# Patient Record
Sex: FEMALE | Race: WHITE | Employment: OTHER | URBAN - METROPOLITAN AREA
[De-identification: names, ages, dates, MRNs, and addresses within clinical notes are randomized per-mention and may not be internally consistent; named-entity substitution may affect disease eponyms.]

---

## 2017-02-06 ENCOUNTER — HOSPITAL ENCOUNTER (OUTPATIENT)
Dept: PREADMISSION TESTING | Age: 82
Discharge: HOME | End: 2017-02-06
Payer: MEDICARE

## 2017-02-06 VITALS
BODY MASS INDEX: 46.23 KG/M2 | DIASTOLIC BLOOD PRESSURE: 61 MMHG | HEIGHT: 62 IN | TEMPERATURE: 98.2 F | HEART RATE: 74 BPM | SYSTOLIC BLOOD PRESSURE: 102 MMHG | WEIGHT: 251.25 LBS

## 2017-02-06 LAB
ANION GAP BLD CALC-SCNC: 8 MMOL/L (ref 5–15)
BASOPHILS # BLD AUTO: 0.1 K/UL (ref 0–0.1)
BASOPHILS # BLD: 0 % (ref 0–1)
BUN SERPL-MCNC: 26 MG/DL (ref 6–20)
BUN/CREAT SERPL: 24 (ref 12–20)
CALCIUM SERPL-MCNC: 9.2 MG/DL (ref 8.5–10.1)
CHLORIDE SERPL-SCNC: 101 MMOL/L (ref 97–108)
CO2 SERPL-SCNC: 27 MMOL/L (ref 21–32)
CREAT SERPL-MCNC: 1.1 MG/DL (ref 0.55–1.02)
EOSINOPHIL # BLD: 0.2 K/UL (ref 0–0.4)
EOSINOPHIL NFR BLD: 1 % (ref 0–7)
ERYTHROCYTE [DISTWIDTH] IN BLOOD BY AUTOMATED COUNT: 14 % (ref 11.5–14.5)
GLUCOSE SERPL-MCNC: 82 MG/DL (ref 65–100)
HCT VFR BLD AUTO: 40.1 % (ref 35–47)
HGB BLD-MCNC: 13.2 G/DL (ref 11.5–16)
LYMPHOCYTES # BLD AUTO: 12 % (ref 12–49)
LYMPHOCYTES # BLD: 1.7 K/UL (ref 0.8–3.5)
MCH RBC QN AUTO: 30.5 PG (ref 26–34)
MCHC RBC AUTO-ENTMCNC: 32.9 G/DL (ref 30–36.5)
MCV RBC AUTO: 92.6 FL (ref 80–99)
MONOCYTES # BLD: 1.1 K/UL (ref 0–1)
MONOCYTES NFR BLD AUTO: 7 % (ref 5–13)
NEUTS SEG # BLD: 11.1 K/UL (ref 1.8–8)
NEUTS SEG NFR BLD AUTO: 80 % (ref 32–75)
PLATELET # BLD AUTO: 222 K/UL (ref 150–400)
POTASSIUM SERPL-SCNC: 4.2 MMOL/L (ref 3.5–5.1)
RBC # BLD AUTO: 4.33 M/UL (ref 3.8–5.2)
SODIUM SERPL-SCNC: 136 MMOL/L (ref 136–145)
WBC # BLD AUTO: 14.1 K/UL (ref 3.6–11)

## 2017-02-06 PROCEDURE — 36415 COLL VENOUS BLD VENIPUNCTURE: CPT | Performed by: ORTHOPAEDIC SURGERY

## 2017-02-06 PROCEDURE — 85025 COMPLETE CBC W/AUTO DIFF WBC: CPT | Performed by: ORTHOPAEDIC SURGERY

## 2017-02-06 PROCEDURE — 93005 ELECTROCARDIOGRAM TRACING: CPT

## 2017-02-06 PROCEDURE — 80048 BASIC METABOLIC PNL TOTAL CA: CPT | Performed by: ORTHOPAEDIC SURGERY

## 2017-02-06 RX ORDER — FLECAINIDE ACETATE 50 MG/1
75 TABLET ORAL 2 TIMES DAILY
COMMUNITY
End: 2017-07-11

## 2017-02-06 NOTE — PERIOP NOTES
Pre-Operative Instructions    DO NOT EAT OR DRINK ANYTHING AFTER MIDNIGHT THE NIGHT BEFORE SURGERY. Patient given surgical site infection information FAQs handout and hand hygiene tips sheet. Pre-operative instructions reviewed and patient verbalizes understanding of instructions. Patient has been given the opportunity to ask additional questions. Given Skin prep chlorhexidine wipes-given written and verbal instructions on use.

## 2017-02-07 LAB
ATRIAL RATE: 51 BPM
CALCULATED P AXIS, ECG09: 56 DEGREES
CALCULATED R AXIS, ECG10: -26 DEGREES
CALCULATED T AXIS, ECG11: 36 DEGREES
DIAGNOSIS, 93000: NORMAL
P-R INTERVAL, ECG05: 150 MS
Q-T INTERVAL, ECG07: 468 MS
QRS DURATION, ECG06: 96 MS
QTC CALCULATION (BEZET), ECG08: 431 MS
VENTRICULAR RATE, ECG03: 51 BPM

## 2017-02-07 NOTE — H&P
Chief complaint:  Left hand numbness    History of present illness: This is a 52-year-old female with a 1-2 year history of left hand numbness and tingling. There is associated pain. The patient complains of intermittent night pain. Symptoms are constant. Treatment has consisted of multiple cortisone injections. She continues to complain of numbness in the index and middle fingers. Past medical history significant for DVT and hypertension    No current facility-administered medications on file prior to encounter. Current Outpatient Prescriptions on File Prior to Encounter   Medication Sig Dispense Refill    POTASSIUM CHLORIDE 20 mEq by Does Not Apply route daily.  lovastatin (MEVACOR) 20 mg tablet Take 20 mg by mouth nightly.  levothyroxine (LEVOXYL) 100 mcg tablet Take 100 mcg by mouth Daily (before breakfast).  benazepril (LOTENSIN) 20 mg tablet Take 20 mg by mouth two (2) times a day.  atenolol (TENORMIN) 25 mg tablet Take 12.5 mg by mouth two (2) times a day.  tramadol (ULTRAM) 50 mg tablet Take 50 mg by mouth as needed.  OMEPRAZOLE MAGNESIUM (PRILOSEC OTC PO) Take 20 mg by mouth daily. Takes on po daily       BENEFIBER, GUAR GUM, PO Take  by mouth. Takes 1/2 tab daily       MULTIVITS W-FE,OTHER MIN (CENTRUM PO) Take  by mouth. Centrum women's vitamin. Takes one po daily       amlodipine (NORVASC) 5 mg tablet Take 5 mg by mouth daily.          Past Medical History   Diagnosis Date    Adverse effect of anesthesia 1998     ASPIRATION    Arrhythmia      atrial fibrillation    Arthritis     Cancer ([facility] [address] 2000     colon - resection/no chemotherapy    Chronic kidney disease      lesion on kidney followed yearly with ultrasound or CT scan    GERD (gastroesophageal reflux disease)     Hypertension     Other ill-defined conditions(449.89)      increased cholesterol    Thromboembolus (HCC)      clot in leg from BCP    Thyroid disease        No Known Allergies    ROS neg    Social History     Social History    Marital status:      Spouse name: N/A    Number of children: N/A    Years of education: N/A     Occupational History    Not on file. Social History Main Topics    Smoking status: Never Smoker    Smokeless tobacco: Never Used    Alcohol use No    Drug use: No    Sexual activity: Not on file     Other Topics Concern    Not on file     Social History Narrative         Physical exam: Alert and oriented, in no acute distress. Respirations even and nonlabored. Mood and affect appropriate. Hearing intact to spoken word. Morbidly obese  female. Positive Tinel's over the carpal canal.  Positive carpal compression test.  5/5 thenar strength. 5/5 1st dorsal interosseous strength. Subjective sensitivity is intact. Negative bilateral Tinel's over the cubital tunnel. Negative flexion compression test at the elbow. Full wrist and digital motion. Full elbow motion. Otherwise neurovascularly intact. X-rays:  None  Nerve conduction study from January of 2016 confirms moderate carpal tunnel syndrome. Assessment:  Left carpal tunnel syndrome    Plan:  I recommend surgical management. The risks, benefits, alternatives and potential complications of surgery were discussed with the patient and she has elected to proceed. Risks discussed included persistent numbness, infection, bleeding, nerve injury, scar, chronic pain, and need for further surgery.

## 2017-02-08 ENCOUNTER — SURGERY (OUTPATIENT)
Age: 82
End: 2017-02-08

## 2017-02-08 ENCOUNTER — ANESTHESIA (OUTPATIENT)
Dept: MEDSURG UNIT | Age: 82
End: 2017-02-08
Payer: MEDICARE

## 2017-02-08 ENCOUNTER — HOSPITAL ENCOUNTER (OUTPATIENT)
Age: 82
Setting detail: OUTPATIENT SURGERY
Discharge: HOME | End: 2017-02-08
Attending: ORTHOPAEDIC SURGERY | Admitting: ORTHOPAEDIC SURGERY
Payer: MEDICARE

## 2017-02-08 ENCOUNTER — ANESTHESIA EVENT (OUTPATIENT)
Dept: MEDSURG UNIT | Age: 82
End: 2017-02-08
Payer: MEDICARE

## 2017-02-08 VITALS
HEART RATE: 62 BPM | RESPIRATION RATE: 18 BRPM | WEIGHT: 251.1 LBS | OXYGEN SATURATION: 96 % | HEIGHT: 62 IN | BODY MASS INDEX: 46.21 KG/M2 | TEMPERATURE: 97.8 F | DIASTOLIC BLOOD PRESSURE: 68 MMHG | SYSTOLIC BLOOD PRESSURE: 121 MMHG

## 2017-02-08 PROCEDURE — 77030002916 HC SUT ETHLN J&J -A: Performed by: ORTHOPAEDIC SURGERY

## 2017-02-08 PROCEDURE — 76210000050 HC AMBSU PH II REC 0.5 TO 1 HR: Performed by: ORTHOPAEDIC SURGERY

## 2017-02-08 PROCEDURE — 74011000250 HC RX REV CODE- 250

## 2017-02-08 PROCEDURE — 77030006602 HC BLD CRPL AGEE MCRA -C: Performed by: ORTHOPAEDIC SURGERY

## 2017-02-08 PROCEDURE — 76060000061 HC AMB SURG ANES 0.5 TO 1 HR: Performed by: ORTHOPAEDIC SURGERY

## 2017-02-08 PROCEDURE — 77030020754 HC CUF TRNQT 2BLA STRY -B: Performed by: ORTHOPAEDIC SURGERY

## 2017-02-08 PROCEDURE — 77030018836 HC SOL IRR NACL ICUM -A: Performed by: ORTHOPAEDIC SURGERY

## 2017-02-08 PROCEDURE — 74011250636 HC RX REV CODE- 250/636

## 2017-02-08 PROCEDURE — 77030026438 HC STYL ET INTUB CARD -A: Performed by: ANESTHESIOLOGY

## 2017-02-08 PROCEDURE — 77030028224 HC PDNG CST BSNM -A: Performed by: ORTHOPAEDIC SURGERY

## 2017-02-08 PROCEDURE — 74011000250 HC RX REV CODE- 250: Performed by: ORTHOPAEDIC SURGERY

## 2017-02-08 PROCEDURE — 76030000000 HC AMB SURG OR TIME 0.5 TO 1: Performed by: ORTHOPAEDIC SURGERY

## 2017-02-08 PROCEDURE — 77030008684 HC TU ET CUF COVD -B: Performed by: ANESTHESIOLOGY

## 2017-02-08 PROCEDURE — 74011250636 HC RX REV CODE- 250/636: Performed by: ANESTHESIOLOGY

## 2017-02-08 PROCEDURE — 76210000034 HC AMBSU PH I REC 0.5 TO 1 HR: Performed by: ORTHOPAEDIC SURGERY

## 2017-02-08 RX ORDER — SODIUM CHLORIDE 0.9 % (FLUSH) 0.9 %
5-10 SYRINGE (ML) INJECTION AS NEEDED
Status: DISCONTINUED | OUTPATIENT
Start: 2017-02-08 | End: 2017-02-08 | Stop reason: HOSPADM

## 2017-02-08 RX ORDER — FENTANYL CITRATE 50 UG/ML
INJECTION, SOLUTION INTRAMUSCULAR; INTRAVENOUS AS NEEDED
Status: DISCONTINUED | OUTPATIENT
Start: 2017-02-08 | End: 2017-02-08 | Stop reason: HOSPADM

## 2017-02-08 RX ORDER — LIDOCAINE HYDROCHLORIDE 10 MG/ML
0.1 INJECTION, SOLUTION EPIDURAL; INFILTRATION; INTRACAUDAL; PERINEURAL AS NEEDED
Status: DISCONTINUED | OUTPATIENT
Start: 2017-02-08 | End: 2017-02-08 | Stop reason: HOSPADM

## 2017-02-08 RX ORDER — MORPHINE SULFATE 10 MG/ML
2 INJECTION, SOLUTION INTRAMUSCULAR; INTRAVENOUS
Status: DISCONTINUED | OUTPATIENT
Start: 2017-02-08 | End: 2017-02-08 | Stop reason: HOSPADM

## 2017-02-08 RX ORDER — BUPIVACAINE HYDROCHLORIDE 5 MG/ML
INJECTION, SOLUTION EPIDURAL; INTRACAUDAL AS NEEDED
Status: DISCONTINUED | OUTPATIENT
Start: 2017-02-08 | End: 2017-02-08 | Stop reason: HOSPADM

## 2017-02-08 RX ORDER — SODIUM CHLORIDE, SODIUM LACTATE, POTASSIUM CHLORIDE, CALCIUM CHLORIDE 600; 310; 30; 20 MG/100ML; MG/100ML; MG/100ML; MG/100ML
125 INJECTION, SOLUTION INTRAVENOUS CONTINUOUS
Status: DISCONTINUED | OUTPATIENT
Start: 2017-02-08 | End: 2017-02-08 | Stop reason: HOSPADM

## 2017-02-08 RX ORDER — ROPIVACAINE HYDROCHLORIDE 5 MG/ML
30 INJECTION, SOLUTION EPIDURAL; INFILTRATION; PERINEURAL
Status: DISCONTINUED | OUTPATIENT
Start: 2017-02-08 | End: 2017-02-08 | Stop reason: HOSPADM

## 2017-02-08 RX ORDER — SUCCINYLCHOLINE CHLORIDE 20 MG/ML
INJECTION INTRAMUSCULAR; INTRAVENOUS AS NEEDED
Status: DISCONTINUED | OUTPATIENT
Start: 2017-02-08 | End: 2017-02-08 | Stop reason: HOSPADM

## 2017-02-08 RX ORDER — HYDROCODONE BITARTRATE AND ACETAMINOPHEN 5; 325 MG/1; MG/1
1-2 TABLET ORAL
Qty: 30 TAB | Refills: 0 | Status: SHIPPED | OUTPATIENT
Start: 2017-02-08 | End: 2017-07-11

## 2017-02-08 RX ORDER — SODIUM CHLORIDE 0.9 % (FLUSH) 0.9 %
5-10 SYRINGE (ML) INJECTION EVERY 8 HOURS
Status: DISCONTINUED | OUTPATIENT
Start: 2017-02-08 | End: 2017-02-08 | Stop reason: HOSPADM

## 2017-02-08 RX ORDER — SODIUM CHLORIDE, SODIUM LACTATE, POTASSIUM CHLORIDE, CALCIUM CHLORIDE 600; 310; 30; 20 MG/100ML; MG/100ML; MG/100ML; MG/100ML
75 INJECTION, SOLUTION INTRAVENOUS CONTINUOUS
Status: DISCONTINUED | OUTPATIENT
Start: 2017-02-08 | End: 2017-02-08 | Stop reason: HOSPADM

## 2017-02-08 RX ORDER — MIDAZOLAM HYDROCHLORIDE 1 MG/ML
0.5 INJECTION, SOLUTION INTRAMUSCULAR; INTRAVENOUS
Status: DISCONTINUED | OUTPATIENT
Start: 2017-02-08 | End: 2017-02-08 | Stop reason: HOSPADM

## 2017-02-08 RX ORDER — SODIUM CHLORIDE 9 MG/ML
25 INJECTION, SOLUTION INTRAVENOUS CONTINUOUS
Status: DISCONTINUED | OUTPATIENT
Start: 2017-02-08 | End: 2017-02-08 | Stop reason: HOSPADM

## 2017-02-08 RX ORDER — HYDROMORPHONE HYDROCHLORIDE 1 MG/ML
0.2 INJECTION, SOLUTION INTRAMUSCULAR; INTRAVENOUS; SUBCUTANEOUS
Status: DISCONTINUED | OUTPATIENT
Start: 2017-02-08 | End: 2017-02-08 | Stop reason: HOSPADM

## 2017-02-08 RX ORDER — PROPOFOL 10 MG/ML
INJECTION, EMULSION INTRAVENOUS AS NEEDED
Status: DISCONTINUED | OUTPATIENT
Start: 2017-02-08 | End: 2017-02-08 | Stop reason: HOSPADM

## 2017-02-08 RX ORDER — FENTANYL CITRATE 50 UG/ML
25 INJECTION, SOLUTION INTRAMUSCULAR; INTRAVENOUS
Status: DISCONTINUED | OUTPATIENT
Start: 2017-02-08 | End: 2017-02-08 | Stop reason: HOSPADM

## 2017-02-08 RX ORDER — MIDAZOLAM HYDROCHLORIDE 1 MG/ML
1 INJECTION, SOLUTION INTRAMUSCULAR; INTRAVENOUS AS NEEDED
Status: DISCONTINUED | OUTPATIENT
Start: 2017-02-08 | End: 2017-02-08 | Stop reason: HOSPADM

## 2017-02-08 RX ORDER — DIPHENHYDRAMINE HYDROCHLORIDE 50 MG/ML
12.5 INJECTION, SOLUTION INTRAMUSCULAR; INTRAVENOUS AS NEEDED
Status: DISCONTINUED | OUTPATIENT
Start: 2017-02-08 | End: 2017-02-08 | Stop reason: HOSPADM

## 2017-02-08 RX ORDER — DEXAMETHASONE SODIUM PHOSPHATE 4 MG/ML
INJECTION, SOLUTION INTRA-ARTICULAR; INTRALESIONAL; INTRAMUSCULAR; INTRAVENOUS; SOFT TISSUE AS NEEDED
Status: DISCONTINUED | OUTPATIENT
Start: 2017-02-08 | End: 2017-02-08 | Stop reason: HOSPADM

## 2017-02-08 RX ORDER — ONDANSETRON 2 MG/ML
INJECTION INTRAMUSCULAR; INTRAVENOUS AS NEEDED
Status: DISCONTINUED | OUTPATIENT
Start: 2017-02-08 | End: 2017-02-08 | Stop reason: HOSPADM

## 2017-02-08 RX ORDER — LIDOCAINE HYDROCHLORIDE 20 MG/ML
INJECTION, SOLUTION EPIDURAL; INFILTRATION; INTRACAUDAL; PERINEURAL AS NEEDED
Status: DISCONTINUED | OUTPATIENT
Start: 2017-02-08 | End: 2017-02-08 | Stop reason: HOSPADM

## 2017-02-08 RX ORDER — ROCURONIUM BROMIDE 10 MG/ML
INJECTION, SOLUTION INTRAVENOUS AS NEEDED
Status: DISCONTINUED | OUTPATIENT
Start: 2017-02-08 | End: 2017-02-08 | Stop reason: HOSPADM

## 2017-02-08 RX ORDER — FENTANYL CITRATE 50 UG/ML
50 INJECTION, SOLUTION INTRAMUSCULAR; INTRAVENOUS AS NEEDED
Status: DISCONTINUED | OUTPATIENT
Start: 2017-02-08 | End: 2017-02-08 | Stop reason: HOSPADM

## 2017-02-08 RX ADMIN — SUCCINYLCHOLINE CHLORIDE 120 MG: 20 INJECTION INTRAMUSCULAR; INTRAVENOUS at 09:30

## 2017-02-08 RX ADMIN — ROCURONIUM BROMIDE 5 MG: 10 INJECTION, SOLUTION INTRAVENOUS at 09:30

## 2017-02-08 RX ADMIN — SODIUM CHLORIDE, SODIUM LACTATE, POTASSIUM CHLORIDE, AND CALCIUM CHLORIDE 125 ML/HR: 600; 310; 30; 20 INJECTION, SOLUTION INTRAVENOUS at 08:15

## 2017-02-08 RX ADMIN — DEXAMETHASONE SODIUM PHOSPHATE 4 MG: 4 INJECTION, SOLUTION INTRA-ARTICULAR; INTRALESIONAL; INTRAMUSCULAR; INTRAVENOUS; SOFT TISSUE at 09:37

## 2017-02-08 RX ADMIN — FENTANYL CITRATE 50 MCG: 50 INJECTION, SOLUTION INTRAMUSCULAR; INTRAVENOUS at 09:30

## 2017-02-08 RX ADMIN — BUPIVACAINE HYDROCHLORIDE 6 ML: 5 INJECTION, SOLUTION EPIDURAL; INTRACAUDAL; PERINEURAL at 09:51

## 2017-02-08 RX ADMIN — PROPOFOL 150 MG: 10 INJECTION, EMULSION INTRAVENOUS at 09:30

## 2017-02-08 RX ADMIN — LIDOCAINE HYDROCHLORIDE 60 MG: 20 INJECTION, SOLUTION EPIDURAL; INFILTRATION; INTRACAUDAL; PERINEURAL at 09:30

## 2017-02-08 RX ADMIN — ONDANSETRON 4 MG: 2 INJECTION INTRAMUSCULAR; INTRAVENOUS at 09:29

## 2017-02-08 NOTE — ANESTHESIA PREPROCEDURE EVALUATION
Anesthetic History   No history of anesthetic complications            Review of Systems / Medical History  Patient summary reviewed, nursing notes reviewed and pertinent labs reviewed    Pulmonary  Within defined limits                 Neuro/Psych   Within defined limits           Cardiovascular    Hypertension: well controlled        Dysrhythmias : atrial fibrillation           GI/Hepatic/Renal  Within defined limits              Endo/Other      Hypothyroidism: well controlled  Morbid obesity     Other Findings              Physical Exam    Airway  Mallampati: II  TM Distance: > 6 cm  Neck ROM: normal range of motion   Mouth opening: Normal     Cardiovascular  Regular rate and rhythm,  S1 and S2 normal,  no murmur, click, rub, or gallop             Dental  No notable dental hx       Pulmonary  Breath sounds clear to auscultation               Abdominal  GI exam deferred       Other Findings            Anesthetic Plan    ASA: 3  Anesthesia type: general          Induction: Intravenous  Anesthetic plan and risks discussed with: Patient

## 2017-02-08 NOTE — IP AVS SNAPSHOT
[address] 
[phone] Patient: Landon Greenwood MRN: [id number] CHRIS:[birth year] You are allergic to the following No active allergies Recent Documentation Height Weight BMI OB Status Smoking Status 1.575 m 113.9 kg 45.93 kg/m2 Postmenopausal Never Smoker Emergency Contacts Name Discharge Info Relation Home Work Mobile Sameer Santo DISCHARGE CAREGIVER [3] Spouse [3] [phone] About your hospitalization You were admitted on:  February 8, 2017 You last received care in the:  [facility] PACU You were discharged on:  February 8, 2017 Unit phone number:  [phone] Why you were hospitalized Your primary diagnosis was:  Not on File Providers Seen During Your Hospitalizations Provider Role Specialty Primary office phone Mp Haile MD Attending Provider Orthopedic Surgery [phone] Your Primary Care Physician (PCP) Primary Care Physician Office Phone Office Fax Radah Larson [phone] Follow-up Information Follow up With Details Comments Contact Info Mp Haile MD In 2 weeks  [address] [facility] [address] [place] [address] 
[phone] Joseph Acuña MD   [place] [address] [address] [place] [address] [phone] Current Discharge Medication List  
  
START taking these medications Dose & Instructions Dispensing Information Comments Morning Noon Evening Bedtime HYDROcodone-acetaminophen 5-325 mg per tablet Commonly known as:  Sudhir Vivienne Your next dose is: Today, Tomorrow Other:  _________ Dose:  1-2 Tab Take 1-2 Tabs by mouth every four (4) hours as needed for Pain. Max Daily Amount: 12 Tabs. Quantity:  30 Tab Refills:  0 CONTINUE these medications which have NOT CHANGED Dose & Instructions Dispensing Information Comments Morning Noon Evening Bedtime  
 amLODIPine 5 mg tablet Commonly known as:  Cynthia Pace Your next dose is: Today, Tomorrow Other:  _________ Dose:  5 mg Take 5 mg by mouth daily. Refills:  0  
     
   
   
   
  
 atenolol 25 mg tablet Commonly known as:  TENORMIN Your next dose is: Today, Tomorrow Other:  _________ Dose:  12.5 mg Take 12.5 mg by mouth two (2) times a day. Refills:  0  
     
   
   
   
  
 benazepril 20 mg tablet Commonly known as:  LOTENSIN Your next dose is: Today, Tomorrow Other:  _________ Dose:  20 mg Take 20 mg by mouth two (2) times a day. Refills:  0 BENEFIBER (GUAR GUM) PO Your next dose is: Today, Tomorrow Other:  _________ Take  by mouth. Takes 1/2 tab daily Refills:  0 CENTRUM PO Your next dose is: Today, Tomorrow Other:  _________ Take  by mouth. Centrum women's vitamin. Takes one po daily Refills:  0  
     
   
   
   
  
 flecainide 50 mg tablet Commonly known as:  TAMBOCOR Your next dose is: Today, Tomorrow Other:  _________ Dose:  75 mg Take 75 mg by mouth two (2) times a day. Refills:  0 LEVOXYL 100 mcg tablet Generic drug:  levothyroxine Your next dose is: Today, Tomorrow Other:  _________ Dose:  100 mcg Take 100 mcg by mouth Daily (before breakfast). Refills:  0  
     
   
   
   
  
 lovastatin 20 mg tablet Commonly known as:  MEVACOR Your next dose is: Today, Tomorrow Other:  _________ Dose:  20 mg Take 20 mg by mouth nightly. Refills:  0 POTASSIUM CHLORIDE Your next dose is: Today, Tomorrow Other:  _________ Dose:  20 mEq 20 mEq by Does Not Apply route daily. Refills:  0 PRILOSEC OTC PO Your next dose is: Today, Tomorrow Other:  _________ Dose:  20 mg Take 20 mg by mouth daily. Takes on po daily Refills:  0  
     
   
   
   
  
 spironolactone 25 mg tab 25 mg, hydroCHLOROthiazide 25 mg tab 25 mg Your next dose is: Today, Tomorrow Other:  _________ Dose:  1 Dose Take 1 Dose by mouth nightly. Refills:  0 XARELTO 20 mg Tab tablet Generic drug:  rivaroxaban Your next dose is: Today, Tomorrow Other:  _________ Dose:  20 mg Take 20 mg by mouth nightly. Refills:  0 STOP taking these medications   
 traMADol 50 mg tablet Commonly known as:  ULTRAM  
   
  
  
  
Where to Get Your Medications Information on where to get these meds will be given to you by the nurse or doctor. ! Ask your nurse or doctor about these medications HYDROcodone-acetaminophen 5-325 mg per tablet Discharge Instructions DISCHARGE SUMMARY from Nurse The following personal items are in your possession at time of discharge: 
 
Dental Appliances: None Clothing:  (clothes in bag) PATIENT INSTRUCTIONS: 
 
 
F-face looks uneven A-arms unable to move or move unevenly S-speech slurred or non-existent T-time-call 911 as soon as signs and symptoms begin-DO NOT go Back to bed or wait to see if you get better-TIME IS BRAIN. Warning Signs of HEART ATTACK Call 911 if you have these symptoms: 
? Chest discomfort.  Most heart attacks involve discomfort in the center of the chest that lasts more than a few minutes, or that goes away and comes back. It can feel like uncomfortable pressure, squeezing, fullness, or pain. ? Discomfort in other areas of the upper body. Symptoms can include pain or discomfort in one or both arms, the back, neck, jaw, or stomach. ? Shortness of breath with or without chest discomfort. ? Other signs may include breaking out in a cold sweat, nausea, or lightheadedness. Don't wait more than five minutes to call [address]! Fast action can save your life. Calling 911 is almost always the fastest way to get lifesaving treatment. Emergency Medical Services staff can begin treatment when they arrive  up to an hour sooner than if someone gets to the hospital by car. The discharge information has been reviewed with the spouse. The spouse verbalized understanding. Discharge medications reviewed with the spouse and appropriate educational materials and side effects teaching were provided. Dr. Harvey Azevedo Carpal Tunnel Postoperative Instructions 1. Please maintain the dressing placed at surgery for 5 days. You may remove it in 5 days. Please keep the dressing clean and dry for 5 days. In 5 days you may get the wound wet and then cover it with a bandaid. If you have any questions or problems with your dressing, please call our office at [phone]. 
2. Please elevate the operative extremity to the level of the heart to keep swelling at a minimum. You may get up to move around, but when seated please keep the extremity elevated as much as possible. This will decrease swelling and postoperative pain. You may do activities as tolerated. 3. You may ice your arm/hand 5 times a day for 20 minutes at a time. 4. A prescription for pain medication is provided. The key to pain control is staying ahead of the pain. For the first 48-72 hours after your surgery, you may want to take your pain medication on a regular schedule. After that, you may only need it on an as needed basis. 5. Signs and symptoms of infection include: redness, increased pain, increased swelling not relieved by elevation, drainage, fever, or chills, If you develop any of these symptoms, call the office at [phone]. Please Follow-up at my office 14 days after surgery or when specified at your pre-operative appointment. Discharge Orders None Introducing [facility]! Dear Graeme Hay: Thank you for requesting a [employer] account. Our records indicate that you already have an active [employer] account. You can access your account anytime at [email or web address] Did you know that you can access your hospital and ER discharge instructions at any time in [employer]? You can also review all of your test results from your hospital stay or ER visit. Additional Information If you have questions, please visit the Frequently Asked Questions section of the [employer] website at [email or web address] Remember, [employer] is NOT to be used for urgent needs. For medical emergencies, dial 911. Now available from your iPhone and Android! General Information Please provide this summary of care documentation to your next provider. Patient Signature:  ____________________________________________________________ Date:  ____________________________________________________________  
  
Lior Woodward Provider Signature:  ____________________________________________________________ Date:  ____________________________________________________________

## 2017-02-08 NOTE — OP NOTES
PREOPERATIVE DIAGNOSIS: Left carpal tunnel syndrome. POSTOPERATIVE DIAGNOSIS: Left carpal tunnel syndrome. PROCEDURES PERFORMED: Endoscopic left carpal tunnel release. SURGEON: Frankey Glaze. Monica Carson MD     ANESTHESIA: General.     ESTIMATED BLOOD LOSS: Minimal.     SPECIMENS REMOVED: None. COMPLICATIONS: None. IMPLANTS: None. INDICATIONS FOR PROCEDURE: This is an [de-identified]year-old female with   electrodiagnostic evidence of severe carpal tunnel syndrome. She has failed   conservative treatment, and wishes to proceed with endoscopic carpal tunnel   release. After discussion of the risks, benefits, potential complications   and alternatives to surgery, he elected to proceed. DESCRIPTION OF PROCEDURE: The patient was identified in the preoperative   holding area. Informed consent was obtained, and the operative site was   marked. The patient was then transferred to the OR, and placed supine on the   operating table. After induction of general anesthesia, the left upper   extremity was sterilely prepped and draped in the usual fashion. A surgical   time-out was held, and the operative site was confirmed. Preoperative   antibiotics were not given. After Esmarch exsanguination, tourniquet was   elevated to 250 mmHg. A transverse incision was made just proximal to the   wrist flexion crease. Dissection was carried down through skin and   subcutaneous tissues, controlling bleeding with electrocautery. The   antebrachial fascia was incised sharply with a 15-blade. The proximal   aspect of the antebrachial fascia was then divided under direct   visualization. The carpal canal was entered, first with a synovial rasp,   and then serial dilators. The endoscope was placed. Good visualization of   the transverse carpal ligament was easily obtained. The transverse carpal   ligament was then sharply divided, starting distally and working   proximally. The 50% distal aspect of the ligament was transected.  The endoscope was then placed back into the carpal canal. Good release was   evident. The remaining 50% was then transected with the endoscope. The   endoscope was removed. The wound was thoroughly irrigated. The area of the   incision was anesthetized with 0.5% Marcaine. The wound was then closed   with 4-0 nylon sutures. Sterile dressings were applied. The tourniquet was let down and brisk cap refill returned to the digits. She tolerated the entire procedure well without complications.

## 2017-02-08 NOTE — ROUTINE PROCESS
Patient: Bandar Lundberg MRN: [id number]  SSN: [id number]   YOB: 1935  Age: [de-identified] y.o. Sex: female     Patient is status post Procedure(s):  LEFT ENDOSCOPIC CARPAL TUNNEL RELEASE. Surgeon(s) and Role:     * Jaimee Bonilla MD - Primary    Local/Dose/Irrigation:  See [facility]                  Peripheral IV 02/08/17 Right Hand (Active)   Site Assessment Clean, dry, & intact 2/8/2017  8:00 AM   Dressing Status Occlusive 2/8/2017  8:00 AM            Airway - Endotracheal Tube 02/08/17 Oral (Active)                   Dressing/Packing:  Wound Hand Left-DRESSING TYPE: 4 x 4;Cast padding;Cotton ball(s);Elastic bandage; Xeroform (02/08/17 0900)  Splint/Cast:  ]    Other:

## 2017-02-08 NOTE — ANESTHESIA POSTPROCEDURE EVALUATION
Post-Anesthesia Evaluation and Assessment    Patient: Landon Greenwood MRN: [id number]  SSN: [id number]    YOB: 1935  Age: [de-identified] y.o. Sex: female       Cardiovascular Function/Vital Signs  Visit Vitals    /64    Pulse 62    Temp 36.6 °C (97.8 °F)    Resp 21    Ht 5' 2\" (1.575 m)    Wt 113.9 kg (251 lb 1.7 oz)    SpO2 96%    BMI 45.93 kg/m2       Patient is status post general anesthesia for Procedure(s):  LEFT ENDOSCOPIC CARPAL TUNNEL RELEASE. Nausea/Vomiting: None    Postoperative hydration reviewed and adequate. Pain:  Pain Scale 1: Numeric (0 - 10) (02/08/17 1005)  Pain Intensity 1: 0 (02/08/17 1005)   Managed    Neurological Status:   Neuro (WDL): Within Defined Limits (02/08/17 1005)   At baseline    Mental Status and Level of Consciousness: Arousable    Pulmonary Status:   O2 Device: Nasal cannula (02/08/17 1007)   Adequate oxygenation and airway patent    Complications related to anesthesia: None    Post-anesthesia assessment completed.  No concerns    Signed By: Ofelia Aburto MD     February 8, 2017

## 2017-02-08 NOTE — DISCHARGE INSTRUCTIONS
DISCHARGE SUMMARY from Nurse    The following personal items are in your possession at time of discharge:    Dental Appliances: None              Clothing:  (clothes in bag)                PATIENT INSTRUCTIONS:    After general anesthesia or intravenous sedation, for 24 hours or while taking prescription Narcotics:  · Limit your activities  · Do not drive and operate hazardous machinery  · Do not make important personal or business decisions  · Do  not drink alcoholic beverages  · If you have not urinated within 8 hours after discharge, please contact your surgeon on call. Report the following to your surgeon:  · Excessive pain, swelling, redness or odor of or around the surgical area  · Temperature over 100.5  · Nausea and vomiting lasting longer than 4 hours or if unable to take medications  · Any signs of decreased circulation or nerve impairment to extremity: change in color, persistent  numbness, tingling, coldness or increase pain  · Any questions        What to do at Home:  Recommended activity: Activity as tolerated,     If you experience any of the following symptoms excess swelling or bleeding, please follow up with Dr Gilberto Garza. *  Please give a list of your current medications to your Primary Care Provider. *  Please update this list whenever your medications are discontinued, doses are      changed, or new medications (including over-the-counter products) are added. *  Please carry medication information at all times in case of emergency situations. These are general instructions for a healthy lifestyle:    No smoking/ No tobacco products/ Avoid exposure to second hand smoke    Surgeon General's Warning:  Quitting smoking now greatly reduces serious risk to your health.     Obesity, smoking, and sedentary lifestyle greatly increases your risk for illness    A healthy diet, regular physical exercise & weight monitoring are important for maintaining a healthy lifestyle    You may be retaining fluid if you have a history of heart failure or if you experience any of the following symptoms:  Weight gain of 3 pounds or more overnight or 5 pounds in a week, increased swelling in our hands or feet or shortness of breath while lying flat in bed. Please call your doctor as soon as you notice any of these symptoms; do not wait until your next office visit. Recognize signs and symptoms of STROKE:    F-face looks uneven    A-arms unable to move or move unevenly    S-speech slurred or non-existent    T-time-call 911 as soon as signs and symptoms begin-DO NOT go       Back to bed or wait to see if you get better-TIME IS BRAIN. Warning Signs of HEART ATTACK     Call 911 if you have these symptoms:   Chest discomfort. Most heart attacks involve discomfort in the center of the chest that lasts more than a few minutes, or that goes away and comes back. It can feel like uncomfortable pressure, squeezing, fullness, or pain.  Discomfort in other areas of the upper body. Symptoms can include pain or discomfort in one or both arms, the back, neck, jaw, or stomach.  Shortness of breath with or without chest discomfort.  Other signs may include breaking out in a cold sweat, nausea, or lightheadedness. Don't wait more than five minutes to call 911 - MINUTES MATTER! Fast action can save your life. Calling 911 is almost always the fastest way to get lifesaving treatment. Emergency Medical Services staff can begin treatment when they arrive -- up to an hour sooner than if someone gets to the hospital by car. The discharge information has been reviewed with the spouse. The spouse verbalized understanding. Discharge medications reviewed with the spouse and appropriate educational materials and side effects teaching were provided. Dr. Almonte Shayan Carpal Tunnel Postoperative Instructions    1. Please maintain the dressing placed at surgery for 5 days. You may remove it in 5 days.  Please keep the dressing clean and dry for 5 days. In 5 days you may get the wound wet and then cover it with a bandaid. If you have any questions or problems with your dressing, please call our office at [phone].  2. Please elevate the operative extremity to the level of the heart to keep swelling at a minimum. You may get up to move around, but when seated please keep the extremity elevated as much as possible. This will decrease swelling and postoperative pain. You may do activities as tolerated. 3. You may ice your arm/hand 5 times a day for 20 minutes at a time. 4. A prescription for pain medication is provided. The key to pain control is staying ahead of the pain. For the first 48-72 hours after your surgery, you may want to take your pain medication on a regular schedule. After that, you may only need it on an as needed basis. 5. Signs and symptoms of infection include: redness, increased pain, increased swelling not relieved by elevation, drainage, fever, or chills, If you develop any of these symptoms, call the office at [phone]. Please Follow-up at my office 14 days after surgery or when specified at your pre-operative appointment.

## 2017-04-19 ENCOUNTER — HOSPITAL ENCOUNTER (OUTPATIENT)
Dept: CT IMAGING | Age: 82
Discharge: HOME | End: 2017-04-19
Attending: NURSE PRACTITIONER
Payer: MEDICARE

## 2017-04-19 DIAGNOSIS — Z85.038 PERSONAL HISTORY OF COLON CANCER: ICD-10-CM

## 2017-04-19 DIAGNOSIS — Z79.01 LONG TERM (CURRENT) USE OF ANTICOAGULANTS: ICD-10-CM

## 2017-04-19 DIAGNOSIS — K86.2 CYST OF PANCREAS: ICD-10-CM

## 2017-04-19 DIAGNOSIS — K58.9 IRRITABLE BOWEL SYNDROME: ICD-10-CM

## 2017-04-19 DIAGNOSIS — I48.91 ATRIAL FIBRILLATION (HCC): ICD-10-CM

## 2017-04-19 PROCEDURE — 74177 CT ABD & PELVIS W/CONTRAST: CPT

## 2017-04-19 PROCEDURE — 74011636320 HC RX REV CODE- 636/320: Performed by: NURSE PRACTITIONER

## 2017-04-19 RX ADMIN — IOPAMIDOL 100 ML: 755 INJECTION, SOLUTION INTRAVENOUS at 10:39

## 2017-07-11 RX ORDER — FLECAINIDE ACETATE 150 MG/1
75 TABLET ORAL 2 TIMES DAILY
COMMUNITY

## 2017-07-11 RX ORDER — TRAMADOL HYDROCHLORIDE 50 MG/1
50 TABLET ORAL 3 TIMES DAILY
COMMUNITY

## 2017-07-12 ENCOUNTER — ANESTHESIA EVENT (OUTPATIENT)
Dept: ENDOSCOPY | Age: 82
End: 2017-07-12
Payer: MEDICARE

## 2017-07-12 ENCOUNTER — ANESTHESIA (OUTPATIENT)
Dept: ENDOSCOPY | Age: 82
End: 2017-07-12
Payer: MEDICARE

## 2017-07-12 ENCOUNTER — HOSPITAL ENCOUNTER (OUTPATIENT)
Age: 82
Setting detail: OUTPATIENT SURGERY
Discharge: HOME | End: 2017-07-12
Attending: SPECIALIST | Admitting: SPECIALIST
Payer: MEDICARE

## 2017-07-12 VITALS
RESPIRATION RATE: 19 BRPM | OXYGEN SATURATION: 92 % | HEART RATE: 61 BPM | TEMPERATURE: 98 F | SYSTOLIC BLOOD PRESSURE: 108 MMHG | DIASTOLIC BLOOD PRESSURE: 44 MMHG

## 2017-07-12 PROCEDURE — 76060000032 HC ANESTHESIA 0.5 TO 1 HR: Performed by: SPECIALIST

## 2017-07-12 PROCEDURE — 77030010936 HC CLP LIG BSC -C: Performed by: SPECIALIST

## 2017-07-12 PROCEDURE — 77030009426 HC FCPS BIOP ENDOSC BSC -B: Performed by: SPECIALIST

## 2017-07-12 PROCEDURE — 77030013992 HC SNR POLYP ENDOSC BSC -B: Performed by: SPECIALIST

## 2017-07-12 PROCEDURE — 88305 TISSUE EXAM BY PATHOLOGIST: CPT | Performed by: SPECIALIST

## 2017-07-12 PROCEDURE — 76040000007: Performed by: SPECIALIST

## 2017-07-12 PROCEDURE — 77030027957 HC TBNG IRR ENDOGTR BUSS -B: Performed by: SPECIALIST

## 2017-07-12 PROCEDURE — 74011000250 HC RX REV CODE- 250

## 2017-07-12 PROCEDURE — 74011250636 HC RX REV CODE- 250/636

## 2017-07-12 DEVICE — WORKING LENGTH 235CM, WORKING CHANNEL 2.8MM
Type: IMPLANTABLE DEVICE | Status: FUNCTIONAL
Brand: RESOLUTION 360 CLIP

## 2017-07-12 RX ORDER — FENTANYL CITRATE 50 UG/ML
200 INJECTION, SOLUTION INTRAMUSCULAR; INTRAVENOUS
Status: DISCONTINUED | OUTPATIENT
Start: 2017-07-12 | End: 2017-07-12 | Stop reason: HOSPADM

## 2017-07-12 RX ORDER — EPINEPHRINE 0.1 MG/ML
1 INJECTION INTRACARDIAC; INTRAVENOUS
Status: DISCONTINUED | OUTPATIENT
Start: 2017-07-12 | End: 2017-07-12 | Stop reason: HOSPADM

## 2017-07-12 RX ORDER — LORAZEPAM 2 MG/ML
2 INJECTION INTRAMUSCULAR AS NEEDED
Status: DISCONTINUED | OUTPATIENT
Start: 2017-07-12 | End: 2017-07-12 | Stop reason: HOSPADM

## 2017-07-12 RX ORDER — SODIUM CHLORIDE 9 MG/ML
INJECTION, SOLUTION INTRAVENOUS
Status: DISCONTINUED | OUTPATIENT
Start: 2017-07-12 | End: 2017-07-12 | Stop reason: HOSPADM

## 2017-07-12 RX ORDER — SODIUM CHLORIDE 9 MG/ML
50 INJECTION, SOLUTION INTRAVENOUS CONTINUOUS
Status: DISCONTINUED | OUTPATIENT
Start: 2017-07-12 | End: 2017-07-12 | Stop reason: HOSPADM

## 2017-07-12 RX ORDER — SODIUM CHLORIDE 0.9 % (FLUSH) 0.9 %
5-10 SYRINGE (ML) INJECTION EVERY 8 HOURS
Status: DISCONTINUED | OUTPATIENT
Start: 2017-07-12 | End: 2017-07-12 | Stop reason: HOSPADM

## 2017-07-12 RX ORDER — PROPOFOL 10 MG/ML
INJECTION, EMULSION INTRAVENOUS AS NEEDED
Status: DISCONTINUED | OUTPATIENT
Start: 2017-07-12 | End: 2017-07-12 | Stop reason: HOSPADM

## 2017-07-12 RX ORDER — MIDAZOLAM HYDROCHLORIDE 1 MG/ML
.25-1 INJECTION, SOLUTION INTRAMUSCULAR; INTRAVENOUS
Status: DISCONTINUED | OUTPATIENT
Start: 2017-07-12 | End: 2017-07-12 | Stop reason: HOSPADM

## 2017-07-12 RX ORDER — FLUMAZENIL 0.1 MG/ML
0.2 INJECTION INTRAVENOUS
Status: DISCONTINUED | OUTPATIENT
Start: 2017-07-12 | End: 2017-07-12 | Stop reason: HOSPADM

## 2017-07-12 RX ORDER — SODIUM CHLORIDE 0.9 % (FLUSH) 0.9 %
5-10 SYRINGE (ML) INJECTION AS NEEDED
Status: DISCONTINUED | OUTPATIENT
Start: 2017-07-12 | End: 2017-07-12 | Stop reason: HOSPADM

## 2017-07-12 RX ORDER — DEXTROMETHORPHAN/PSEUDOEPHED 2.5-7.5/.8
1.2 DROPS ORAL
Status: DISCONTINUED | OUTPATIENT
Start: 2017-07-12 | End: 2017-07-12 | Stop reason: HOSPADM

## 2017-07-12 RX ORDER — NALOXONE HYDROCHLORIDE 0.4 MG/ML
0.4 INJECTION, SOLUTION INTRAMUSCULAR; INTRAVENOUS; SUBCUTANEOUS
Status: DISCONTINUED | OUTPATIENT
Start: 2017-07-12 | End: 2017-07-12 | Stop reason: HOSPADM

## 2017-07-12 RX ORDER — ATROPINE SULFATE 0.1 MG/ML
0.5 INJECTION INTRAVENOUS
Status: DISCONTINUED | OUTPATIENT
Start: 2017-07-12 | End: 2017-07-12 | Stop reason: HOSPADM

## 2017-07-12 RX ORDER — GLYCOPYRROLATE 0.2 MG/ML
INJECTION INTRAMUSCULAR; INTRAVENOUS AS NEEDED
Status: DISCONTINUED | OUTPATIENT
Start: 2017-07-12 | End: 2017-07-12 | Stop reason: HOSPADM

## 2017-07-12 RX ADMIN — PROPOFOL 30 MG: 10 INJECTION, EMULSION INTRAVENOUS at 07:51

## 2017-07-12 RX ADMIN — PROPOFOL 30 MG: 10 INJECTION, EMULSION INTRAVENOUS at 08:00

## 2017-07-12 RX ADMIN — SODIUM CHLORIDE: 9 INJECTION, SOLUTION INTRAVENOUS at 07:16

## 2017-07-12 RX ADMIN — PROPOFOL 50 MG: 10 INJECTION, EMULSION INTRAVENOUS at 07:35

## 2017-07-12 RX ADMIN — GLYCOPYRROLATE 0.2 MG: 0.2 INJECTION INTRAMUSCULAR; INTRAVENOUS at 07:42

## 2017-07-12 RX ADMIN — PROPOFOL 30 MG: 10 INJECTION, EMULSION INTRAVENOUS at 07:39

## 2017-07-12 RX ADMIN — PROPOFOL 30 MG: 10 INJECTION, EMULSION INTRAVENOUS at 07:43

## 2017-07-12 RX ADMIN — PROPOFOL 30 MG: 10 INJECTION, EMULSION INTRAVENOUS at 08:05

## 2017-07-12 NOTE — ANESTHESIA PREPROCEDURE EVALUATION
Anesthetic History   No history of anesthetic complications            Review of Systems / Medical History  Patient summary reviewed, nursing notes reviewed and pertinent labs reviewed    Pulmonary        Sleep apnea           Neuro/Psych   Within defined limits           Cardiovascular    Hypertension        Dysrhythmias : atrial fibrillation      Exercise tolerance: >4 METS     GI/Hepatic/Renal     GERD          Comments: Hx polyps   Occult blood in stool Endo/Other      Hypothyroidism  Morbid obesity and arthritis     Other Findings   Comments: xaralto-- 2 days ago         Physical Exam    Airway  Mallampati: II  TM Distance: 4 - 6 cm    Mouth opening: Normal     Cardiovascular    Rhythm: regular  Rate: normal         Dental  No notable dental hx       Pulmonary  Breath sounds clear to auscultation               Abdominal  Abdominal exam normal       Other Findings            Anesthetic Plan    ASA: 3  Anesthesia type: MAC          Induction: Intravenous  Anesthetic plan and risks discussed with: Patient

## 2017-07-12 NOTE — ROUTINE PROCESS
Irma Simba Word  1935  [id number]    Situation:  Verbal report received from: [place]  Procedure: Procedure(s):  COLONOSCOPY  ENDOSCOPIC POLYPECTOMY    Background:    Preoperative diagnosis: PERS HX COLON POLYPS, PERS HX COLON CA, OCCULT BLOOD IN STOOL  Postoperative diagnosis: 1.- Moderate Sigmoid Diverticulosis    :  Dr. Lawrence Benavidez  Assistant(s): Endoscopy Technician-1: Reji Evans  Endoscopy RN-1: Zaina Rodriguez RN    Specimens:   ID Type Source Tests Collected by Time Destination   1 : Descending Colon Polyp Preservative   Levora Runner, MD 7/12/2017 0758 Pathology   2 : Rectal Polyp Preservative   Levora Runner, MD 7/12/2017 0801 Pathology   3 : 3mm Rectal polyp ervative   Levora Runner, MD 7/12/2017 7343 Pathology     H. Pylori  no    Assessment:  Intra-procedure medications     Anesthesia gave intra-procedure sedation and medications, see anesthesia flow sheet yes    Intravenous fluids: NS@ KVO     Vital signs stable     Abdominal assessment: round and soft     Recommendation:  Discharge patient per MD order.   Return to floor  Family or Friend   Permission to share finding with family or friend yes

## 2017-07-12 NOTE — IP AVS SNAPSHOT
[address] 
[phone] Patient: Elder Perez MRN: [id number] YHM:[id number] You are allergic to the following No active allergies Recent Documentation OB Status Smoking Status Postmenopausal Never Smoker Emergency Contacts Name Discharge Info Relation Home Work Mobile Sameer Santo DISCHARGE CAREGIVER [3] Spouse [3] [phone] About your hospitalization You were admitted on:  July 12, 2017 You last received care in the:  [facility] ENDOSCOPY You were discharged on:  July 12, 2017 Unit phone number:  [phone] Why you were hospitalized Your primary diagnosis was:  Not on File Providers Seen During Your Hospitalizations Provider Role Specialty Primary office phone Ki Navarro MD Attending Provider Gastroenterology [phone] Your Primary Care Physician (PCP) Primary Care Physician Office Phone Office Fax Bibi Odea [phone] Follow-up Information None Current Discharge Medication List  
  
CONTINUE these medications which have NOT CHANGED Dose & Instructions Dispensing Information Comments Morning Noon Evening Bedtime  
 amLODIPine 5 mg tablet Commonly known as:  Venancio Lute Your last dose was: Your next dose is:    
   
   
 Dose:  5 mg Take 5 mg by mouth daily. Refills:  0  
     
   
   
   
  
 atenolol 25 mg tablet Commonly known as:  TENORMIN Your last dose was: Your next dose is:    
   
   
 Dose:  25 mg Take 25 mg by mouth two (2) times a day. Refills:  0  
     
   
   
   
  
 benazepril 20 mg tablet Commonly known as:  LOTENSIN Your last dose was: Your next dose is:    
   
   
 Dose:  20 mg Take 20 mg by mouth two (2) times a day. Refills:  0 CALCIUM + D PO Your last dose was: Your next dose is: Take  by mouth. Takes one poonce daily. Refills:  0 CENTRUM SILVER PO Your last dose was: Your next dose is: Take  by mouth. Takes one po once daily. Refills:  0  
     
   
   
   
  
 flecainide 150 mg tablet Commonly known as:  TAMBOCOR Your last dose was: Your next dose is:    
   
   
 Dose:  75 mg Take 75 mg by mouth two (2) times a day. Refills:  0 LEVOXYL 100 mcg tablet Generic drug:  levothyroxine Your last dose was: Your next dose is:    
   
   
 Dose:  100 mcg Take 100 mcg by mouth Daily (before breakfast). Refills:  0  
     
   
   
   
  
 lovastatin 20 mg tablet Commonly known as:  MEVACOR Your last dose was: Your next dose is:    
   
   
 Dose:  20 mg Take 20 mg by mouth nightly. Refills:  0  
     
   
   
   
  
 * OTHER Your last dose was: Your next dose is:    
   
   
 Fiber/probiotic - takes one po once daily. Refills:  0  
     
   
   
   
  
 * OTHER Your last dose was: Your next dose is:    
   
   
 Takes a [facility] brand of fiber. Takes one half po once daily. Refills:  0 POTASSIUM CHLORIDE Your last dose was: Your next dose is:    
   
   
 Dose:  20 mEq Take 20 mEq by mouth daily. Refills:  0 PRILOSEC OTC PO Your last dose was: Your next dose is:    
   
   
 Dose:  20 mg Take 20 mg by mouth daily. Refills:  0  
     
   
   
   
  
 spironolactone 25 mg tab 25 mg, hydroCHLOROthiazide 25 mg tab 25 mg Your last dose was: Your next dose is:    
   
   
 Dose:  1 Dose Take 1 Dose by mouth nightly. One tab po once daily at night. Refills:  0  
     
   
   
   
  
 traMADol 50 mg tablet Commonly known as:  Carson Mckinney  
   
 Your last dose was: Your next dose is:    
   
   
 Dose:  50 mg Take 50 mg by mouth three (3) times daily. Refills:  0  
     
   
   
   
  
 TYLENOL EXTRA STRENGTH PO Your last dose was: Your next dose is:    
   
   
 Dose:  1 Tab Take 1 Tab by mouth three (3) times daily. Refills:  0  
     
   
   
   
  
 * Notice: This list has 2 medication(s) that are the same as other medications prescribed for you. Read the directions carefully, and ask your doctor or other care provider to review them with you. STOP taking these medications XARELTO 20 mg Tab tablet Generic drug:  rivaroxaban Discharge Instructions Sriram Stockton 
[id number] 
1935 COLON DISCHARGE INSTRUCTIONS Discomfort: 
Redness at IV site- apply warm compress to area; if redness or soreness persist- contact your physician There may be a slight amount of blood passed from the rectum Gaseous discomfort- walking, belching will help relieve any discomfort You may not operate a vehicle for 12 hours You may not engage in an occupation involving machinery or appliances for rest of today You may not drink alcoholic beverages for at least 12 hours Avoid making any critical decisions for at least 24 hour DIET: 
 Regular diet.  however -  remember your colon is empty and a heavy meal will produce gas. Avoid these foods:  vegetables, fried / greasy foods, carbonated drinks for today. ACTIVITY: 
You may resume your normal daily activities it is recommended that you spend the remainder of the day resting -  avoid any strenuous activity. CALL M.D. ANY SIGN OF: Increasing pain, nausea, vomiting Abdominal distension (swelling) New increased bleeding (oral or rectal) Fever (chills) Pain in chest area Bloody discharge from nose or mouth Shortness of breath You may not  take any Advil, Aspirin, Ibuprofen, Motrin, Aleve, Goodys, or any similar pain or arthritis products for 7 days, ONLY  Tylenol as needed for pain. Resume Xarelto in 3 days Follow-up Instructions: 
 Call Dr. Lalo Hernandez Results of procedure / biopsy in 10-14days Office telephone for problems or questions [phone] No need to repeat surveillance colonoscopy. Would consider if there are GI symptoms. Check a immunoassay stool test in three years for occult blood called FIT test 
 
 
 
Discharge Orders None Introducing [facility]! Dear Griselda Conner: Thank you for requesting a [employer] account. Our records indicate that you already have an active [employer] account. You can access your account anytime at [email or web address] Did you know that you can access your hospital and ER discharge instructions at any time in [employer]? You can also review all of your test results from your hospital stay or ER visit. Additional Information If you have questions, please visit the Frequently Asked Questions section of the [employer] website at [email or web address] Remember, [employer] is NOT to be used for urgent needs. For medical emergencies, dial 911. Now available from your iPhone and Android! General Information Please provide this summary of care documentation to your next provider. Patient Signature:  ____________________________________________________________ Date:  ____________________________________________________________  
  
Chato Durham Provider Signature:  ____________________________________________________________ Date:  ____________________________________________________________

## 2017-07-12 NOTE — DISCHARGE INSTRUCTIONS
Luis Whitaker Word  [id number]  1935    COLON DISCHARGE INSTRUCTIONS  Discomfort:  Redness at IV site- apply warm compress to area; if redness or soreness persist- contact your physician  There may be a slight amount of blood passed from the rectum  Gaseous discomfort- walking, belching will help relieve any discomfort  You may not operate a vehicle for 12 hours  You may not engage in an occupation involving machinery or appliances for rest of today  You may not drink alcoholic beverages for at least 12 hours  Avoid making any critical decisions for at least 24 hour  DIET:   Regular diet. - however -  remember your colon is empty and a heavy meal will produce gas. Avoid these foods:  vegetables, fried / greasy foods, carbonated drinks for today. ACTIVITY:  You may resume your normal daily activities it is recommended that you spend the remainder of the day resting -  avoid any strenuous activity. CALL M.D. ANY SIGN OF:  Increasing pain, nausea, vomiting  Abdominal distension (swelling)  New increased bleeding (oral or rectal)  Fever (chills)  Pain in chest area  Bloody discharge from nose or mouth  Shortness of breath    You may not  take any Advil, Aspirin, Ibuprofen, Motrin, Aleve, Goodys, or any similar pain or arthritis products for 7 days, ONLY  Tylenol as needed for pain. Resume Xarelto in 3 days      Follow-up Instructions:   Call Dr. Delio Szymanski  Results of procedure / biopsy in 10-14days   Office telephone for problems or questions [phone]    No need to repeat surveillance colonoscopy. Would consider if there are GI symptoms.  Check a immunoassay stool test in three years for occult blood called FIT test

## 2017-07-12 NOTE — ANESTHESIA POSTPROCEDURE EVALUATION
Post-Anesthesia Evaluation and Assessment    Patient: Cristel Santo MRN: [id number]  SSN: [id number]    YOB: 1935  Age: 81 y.o. Sex: female       Cardiovascular Function/Vital Signs  Visit Vitals    /44    Pulse 61    Temp 36.7 °C (98 °F)    Resp 19    SpO2 92%       Patient is status post MAC anesthesia for Procedure(s):  COLONOSCOPY  ENDOSCOPIC POLYPECTOMY. Nausea/Vomiting: None    Postoperative hydration reviewed and adequate. Pain:  Pain Scale 1: Numeric (0 - 10) (07/12/17 0823)  Pain Intensity 1: 0 (07/12/17 7768)   Managed    Neurological Status: At baseline    Mental Status and Level of Consciousness: Arousable    Pulmonary Status:   O2 Device: Room air (07/12/17 0857)   Adequate oxygenation and airway patent    Complications related to anesthesia: None    Post-anesthesia assessment completed.  No concerns    Signed By: Louise Emmanuel MD     July 12, 2017

## 2017-07-12 NOTE — PROGRESS NOTES

## 2017-07-12 NOTE — H&P
Pre-endoscopy H and P for Colonoscopy    The patient was seen and examined. Date of last colonoscopy: 2011, Polyps  Yes      The airway was assessed and documented. The problem list, past medical history, and medications were reviewed. There is no problem list on file for this patient. Social History     Social History    Marital status:      Spouse name: N/A    Number of children: N/A    Years of education: N/A     Occupational History    Not on file. Social History Main Topics    Smoking status: Never Smoker    Smokeless tobacco: Never Used    Alcohol use No    Drug use: No    Sexual activity: Not on file     Other Topics Concern    Not on file     Social History Narrative     Past Medical History:   Diagnosis Date    Adverse effect of anesthesia 1998    ASPIRATION    Adverse effect of anesthesia     \"trouble waking up\"    Arrhythmia     atrial fibrillation    Arthritis     Cancer ([facility] [address] 2000    colon - resection/no chemotherapy or radiation    Chronic kidney disease     lesion on kidney followed yearly with ultrasound or CT scan    CTS (carpal tunnel syndrome)     left    GERD (gastroesophageal reflux disease)     Hypertension     Ill-defined condition     obesity per pt    Other ill-defined conditions     increased cholesterol    Sleep apnea     does not wear mask at night    Thromboembolus (HCC)     clot in leg from BCP    Thyroid disease      The patient has a family history of na, PHx of colon polyps and cancer    Prior to Admission Medications   Prescriptions Last Dose Informant Patient Reported? Taking? ACETAMINOPHEN (TYLENOL EXTRA STRENGTH PO) 7/11/2017 at Unknown time  Yes Yes   Sig: Take 1 Tab by mouth three (3) times daily. CALCIUM CARBONATE/VITAMIN D3 (CALCIUM + D PO) 7/11/2017 at Unknown time  Yes Yes   Sig: Take  by mouth. Takes one poonce daily. FOLIC ACID/MULTIVIT-MIN/LUTEIN (CENTRUM SILVER PO) 7/11/2017 at Unknown time  Yes Yes   Sig: Take  by mouth. Takes one po once daily. OMEPRAZOLE MAGNESIUM (PRILOSEC OTC PO) 7/11/2017 at Unknown time  Yes Yes   Sig: Take 20 mg by mouth daily. OTHER 7/5/2017 at Unknown time  Yes Yes   Sig: Fiber/probiotic - takes one po once daily. OTHER 7/5/2017 at Unknown time  Yes Yes   Sig: Takes a [facility] brand of fiber. Takes one half po once daily. POTASSIUM CHLORIDE 7/11/2017 at Unknown time  Yes Yes   Sig: Take 20 mEq by mouth daily. amlodipine (NORVASC) 5 mg tablet 7/11/2017 at Unknown time  Yes Yes   Sig: Take 5 mg by mouth daily. atenolol (TENORMIN) 25 mg tablet 7/12/2017 at Unknown time  Yes Yes   Sig: Take 25 mg by mouth two (2) times a day. benazepril (LOTENSIN) 20 mg tablet 7/11/2017 at Unknown time  Yes Yes   Sig: Take 20 mg by mouth two (2) times a day. flecainide (TAMBOCOR) 150 mg tablet 7/11/2017 at Unknown time  Yes Yes   Sig: Take 75 mg by mouth two (2) times a day. levothyroxine (LEVOXYL) 100 mcg tablet 7/11/2017 at Unknown time  Yes Yes   Sig: Take 100 mcg by mouth Daily (before breakfast). lovastatin (MEVACOR) 20 mg tablet 7/11/2017 at Unknown time  Yes Yes   Sig: Take 20 mg by mouth nightly. rivaroxaban (XARELTO) 20 mg tab tablet 7/10/2017 at Unknown time  Yes No   Sig: Take 20 mg by mouth nightly. spironolactone 25 mg tab 25 mg, hydroCHLOROthiazide 25 mg tab 25 mg 7/11/2017 at Unknown time  Yes Yes   Sig: Take 1 Dose by mouth nightly. One tab po once daily at night. traMADol (ULTRAM) 50 mg tablet 7/12/2017 at Unknown time  Yes Yes   Sig: Take 50 mg by mouth three (3) times daily. Facility-Administered Medications: None         The review of systems is:  negative for shortness of breath or chest pain      The heart, lungs and mental status were satisfactory for the administration of MAC sedation and for the procedure. Mallampati score: See Anesthesia. I discussed with the patient the objectives, risks, consequences and alternatives to the procedure.       Plan: Endoscopic procedure with MAC sedation.     Liya Harris MD  7/12/2017  7:32 AM

## 2017-07-23 NOTE — PROCEDURES
Colonoscopy Procedure Note    Indications:   Occult blood in stool, Personal history of colon cancer (screening only), s/p right hemicolectomy  Referring Physician: Titus Schaumann, MD   Anesthesia/Sedation:MAC  Endoscopist:  Dr. Aixa Cole  Assistant:  Endoscopy Technician-1: Justin Gao  Endoscopy RN-1: Jurgen Gaytan RN    Preoperative diagnosis: PERS HX COLON POLYPS, PERS HX COLON CA, OCCULT BLOOD IN STOOL    Postoperative diagnosis: 1.- Moderate Sigmoid Diverticulosis      Procedure in Detail:  Informed consent was obtained for the procedure, including sedation. Risks of perforation, hemorrhage, adverse drug reaction, and aspiration were discussed. The patient was placed in the left lateral decubitus position. Based on the pre-procedure assessment, including review of the patient's medical history, medications, allergies, and review of systems, she had been deemed to be an appropriate candidate for moderate sedation; she was therefore sedated with the medications listed above. The patient was monitored continuously with ECG tracing, pulse oximetry, blood pressure monitoring, and direct observations. A rectal examination was performed. The [id number] was inserted into the rectum and advanced under direct vision to the terminal ileum. The quality of the colonic preparation was excellent. A careful inspection was made as the colonoscope was withdrawn, including a retroflexed view of the rectum; findings and interventions are described below. Findings:   Rectum: several hyperplastic polyps removed and one adenoma removed with cold snare submitted separately  Sigmoid: moderate diverticulosis;   Descending Colon: 3 mm polyp removed with cold snare  Transverse Colon: normal  Ascending Colon: surgically absent  Cecum: surgically absent  Terminal Ileum: normal with end-to-side ileocolonic anastomosis    Specimens:     see above    EBL: None    Complications: None; patient tolerated the procedure well. Recommendations:     - Await pathology. - Considereing benign findings on this exam and exam in 2013 as well as her age, do not recommend routine follow up.  Could check her stools in three years and if the immunoassay FIT is (+) discuss repeat exam. Also advised her to follow up with Dr. [facility] regarding her complex renal cyst demonstrated on CT scan    Signed By: Rupesh Mancilla MD                        July 23, 2017

## 2018-09-20 ENCOUNTER — HOSPITAL ENCOUNTER (OUTPATIENT)
Dept: CT IMAGING | Age: 83
Discharge: HOME | End: 2018-09-20
Payer: MEDICARE

## 2018-09-20 DIAGNOSIS — N28.1 ACQUIRED CYST OF KIDNEY: ICD-10-CM

## 2018-09-20 DIAGNOSIS — R10.32 LLQ PAIN: ICD-10-CM

## 2018-09-20 PROCEDURE — 74176 CT ABD & PELVIS W/O CONTRAST: CPT

## 2018-09-20 RX ORDER — BARIUM SULFATE 20 MG/ML
900 SUSPENSION ORAL
Status: COMPLETED | OUTPATIENT
Start: 2018-09-20 | End: 2018-09-20

## 2018-09-20 RX ADMIN — BARIUM SULFATE 900 ML: 20 SUSPENSION ORAL at 11:34

## 2019-01-30 ENCOUNTER — HOSPITAL ENCOUNTER (OUTPATIENT)
Dept: GENERAL RADIOLOGY | Age: 84
Discharge: HOME | End: 2019-01-30
Payer: MEDICARE

## 2019-01-30 DIAGNOSIS — K56.41 FECAL IMPACTION (HCC): ICD-10-CM

## 2019-01-30 PROCEDURE — 74019 RADEX ABDOMEN 2 VIEWS: CPT

## 2021-11-15 ENCOUNTER — TRANSCRIBE ORDER (OUTPATIENT)
Dept: SCHEDULING | Age: 86
End: 2021-11-15

## 2021-11-15 DIAGNOSIS — M43.16 SPONDYLOLISTHESIS OF LUMBAR REGION: Primary | ICD-10-CM

## 2021-11-22 ENCOUNTER — HOSPITAL ENCOUNTER (EMERGENCY)
Age: 86
Discharge: STILL ADMITTED OR ADMINISTRATIVE | End: 2021-11-22
Attending: EMERGENCY MEDICINE

## 2021-11-22 ENCOUNTER — HOSPITAL ENCOUNTER (OUTPATIENT)
Dept: MRI IMAGING | Age: 86
Discharge: HOME | End: 2021-11-22
Attending: ORTHOPAEDIC SURGERY
Payer: MEDICARE

## 2021-11-22 DIAGNOSIS — M43.16 SPONDYLOLISTHESIS OF LUMBAR REGION: ICD-10-CM

## 2021-11-22 PROCEDURE — 72148 MRI LUMBAR SPINE W/O DYE: CPT

## 2023-05-12 RX ORDER — TRAMADOL HYDROCHLORIDE 50 MG/1
TABLET ORAL 3 TIMES DAILY
COMMUNITY

## 2023-05-12 RX ORDER — AMLODIPINE BESYLATE 5 MG/1
5 TABLET ORAL DAILY
COMMUNITY

## 2023-05-12 RX ORDER — FLECAINIDE ACETATE 150 MG/1
TABLET ORAL 2 TIMES DAILY
COMMUNITY

## 2023-05-12 RX ORDER — LEVOTHYROXINE SODIUM 0.1 MG/1
TABLET ORAL
COMMUNITY

## 2023-05-12 RX ORDER — BENAZEPRIL HYDROCHLORIDE 20 MG/1
TABLET ORAL 2 TIMES DAILY
COMMUNITY

## 2023-05-12 RX ORDER — ATENOLOL 25 MG/1
TABLET ORAL 2 TIMES DAILY
COMMUNITY

## 2023-05-12 RX ORDER — LOVASTATIN 20 MG/1
20 TABLET ORAL NIGHTLY
COMMUNITY

## (undated) DEVICE — TRAP SURG QUAD PARABOLA SLOT DSGN SFTY SCRN TRAPEASE

## (undated) DEVICE — Device

## (undated) DEVICE — BANDAGE COMPR 9 FTX4 IN SMOOTH COMFORTABLE SYNTH ESMRK LF

## (undated) DEVICE — QUILTED PREMIUM COMFORT UNDERPAD,EXTRA HEAVY: Brand: WINGS

## (undated) DEVICE — TIBURON HAND DRAPE: Brand: CONVERTORS

## (undated) DEVICE — SET EXTN TBNG L BOR 4 W STPCOCK ST 32IN PRIMING VOL 6ML

## (undated) DEVICE — SOLIDIFIER FLUID 3000 CC ABSORB

## (undated) DEVICE — BAG SPEC BIOHZD LF 2MIL 6X10IN -- CONVERT TO ITEM 357326

## (undated) DEVICE — CLIP INT L235CM WRK CHAN DIA2.8MM OPN 11MM LCK MECHANISM MR

## (undated) DEVICE — NEONATAL-ADULT SPO2 SENSOR: Brand: NELLCOR

## (undated) DEVICE — CONTAINER SPEC 20 ML LID NEUT BUFF FORMALIN 10 % POLYPR STS

## (undated) DEVICE — SUT ETHLN 4-0 18IN PS2 BLK --

## (undated) DEVICE — Z DISCONTINUED USE 2751540 TUBING IRRIG L10IN DISP PMP ENDOGATOR

## (undated) DEVICE — BIPOLAR FORCEPS CORD,BANANA LEADS: Brand: VALLEYLAB

## (undated) DEVICE — GAUZE SPONGES,12 PLY: Brand: CURITY

## (undated) DEVICE — KIT IV STRT W CHLORAPREP PD 1ML

## (undated) DEVICE — AIRLIFE™ U/CONNECT-IT OXYGEN TUBING 7 FEET (2.1 M) CRUSH-RESISTANT OXYGEN TUBING, VINYL TIPPED: Brand: AIRLIFE™

## (undated) DEVICE — DISPOSABLE TOURNIQUET CUFF SINGLE BLADDER, DUAL PORT AND QUICK CONNECT CONNECTOR: Brand: COLOR CUFF

## (undated) DEVICE — SNARE ENDOSCP M L240CM W27MM SHTH DIA2.4MM CHN 2.8MM OVL

## (undated) DEVICE — SOL ANTI-FOG 6ML MEDC -- MEDICHOICE - CONVERT TO 358427

## (undated) DEVICE — (D)PREP SKN CHLRAPRP APPL 26ML -- CONVERT TO ITEM 371833

## (undated) DEVICE — ENDO CARRY-ON PROCEDURE KIT INCLUDES ENZYMATIC SPONGE, GAUZE, BIOHAZARD LABEL, TRAY, LUBRICANT, DIRTY SCOPE LABEL, WATER LABEL, TRAY, DRAWSTRING PAD, AND DEFENDO 4-PIECE KIT.: Brand: ENDO CARRY-ON PROCEDURE KIT

## (undated) DEVICE — (D)SYR 10ML 1/5ML GRAD NSAF -- PKGING CHANGE USE ITEM 338027

## (undated) DEVICE — OCCLUSIVE GAUZE STRIP,3% BISMUTH TRIBROMOPHENATE IN PETROLATUM BLEND: Brand: XEROFORM

## (undated) DEVICE — DRAPE,REIN 53X77,STERILE: Brand: MEDLINE

## (undated) DEVICE — BAG BELONG PT PERS CLEAR HANDL

## (undated) DEVICE — KENDALL RADIOLUCENT FOAM MONITORING ELECTRODE -RECTANGULAR SHAPE: Brand: KENDALL

## (undated) DEVICE — STANDARD (U) BLADE ASSEMBLY 1PK: Brand: MICROAIRE®

## (undated) DEVICE — CONNECTOR TBNG AUX H2O JET DISP FOR OLY 160/180 SER

## (undated) DEVICE — NEEDLE HYPO 25GA L1.5IN BVL ORIENTED ECLIPSE

## (undated) DEVICE — SET ADMIN 16ML TBNG L100IN 2 Y INJ SITE IV PIGGY BK DISP

## (undated) DEVICE — Z DISCONTINUED GLOVE SURG SZ 7.5 L12IN FNGR THK13MIL WHT ISOLEX

## (undated) DEVICE — HOOK LOCK LATEX FREE ELASTIC BANDAGE 3INX5YD

## (undated) DEVICE — Z INACTIVE NO USAGE TURNOVER KIT RM CLEANOP

## (undated) DEVICE — CANN NASAL O2 CAPNOGRAPHY AD -- FILTERLINE

## (undated) DEVICE — 1200 GUARD II KIT W/5MM TUBE W/O VAC TUBE: Brand: GUARDIAN

## (undated) DEVICE — SOLUTION IV 1000ML 0.9% SOD CHL

## (undated) DEVICE — FORCEPS BX L240CM JAW DIA2.8MM L CAP W/ NDL MIC MESH TOOTH

## (undated) DEVICE — NEEDLE HYPO 18GA L1.5IN PNK S STL HUB POLYPR SHLD REG BVL

## (undated) DEVICE — PADDING CST 4IN STERILE --

## (undated) DEVICE — SYRINGE MED 20ML STD CLR PLAS LUERLOCK TIP N CTRL DISP

## (undated) DEVICE — BW-412T DISP COMBO CLEANING BRUSH: Brand: SINGLE USE COMBINATION CLEANING BRUSH

## (undated) DEVICE — CATH IV AUTOGRD BC BLU 22GA 25 -- INSYTE